# Patient Record
Sex: MALE | Race: WHITE | NOT HISPANIC OR LATINO | Employment: OTHER | URBAN - NONMETROPOLITAN AREA
[De-identification: names, ages, dates, MRNs, and addresses within clinical notes are randomized per-mention and may not be internally consistent; named-entity substitution may affect disease eponyms.]

---

## 2024-08-12 ENCOUNTER — OFFICE VISIT (OUTPATIENT)
Dept: CARDIOLOGY | Facility: CLINIC | Age: 74
End: 2024-08-12
Payer: MEDICARE

## 2024-08-12 VITALS
DIASTOLIC BLOOD PRESSURE: 66 MMHG | HEIGHT: 70 IN | HEART RATE: 72 BPM | WEIGHT: 257 LBS | SYSTOLIC BLOOD PRESSURE: 110 MMHG | OXYGEN SATURATION: 96 % | BODY MASS INDEX: 36.79 KG/M2

## 2024-08-12 DIAGNOSIS — G47.33 OBSTRUCTIVE SLEEP APNEA SYNDROME: Primary | ICD-10-CM

## 2024-08-12 PROCEDURE — 1159F MED LIST DOCD IN RCRD: CPT | Performed by: NURSE PRACTITIONER

## 2024-08-12 PROCEDURE — 99203 OFFICE O/P NEW LOW 30 MIN: CPT | Performed by: NURSE PRACTITIONER

## 2024-08-12 PROCEDURE — 1160F RVW MEDS BY RX/DR IN RCRD: CPT | Performed by: NURSE PRACTITIONER

## 2024-08-12 RX ORDER — MEMANTINE HYDROCHLORIDE 10 MG/1
TABLET ORAL
COMMUNITY

## 2024-08-12 RX ORDER — PANTOPRAZOLE SODIUM 40 MG/1
40 TABLET, DELAYED RELEASE ORAL DAILY
COMMUNITY

## 2024-08-12 RX ORDER — ATORVASTATIN CALCIUM 20 MG/1
20 TABLET, FILM COATED ORAL DAILY
COMMUNITY

## 2024-08-12 RX ORDER — LEVOTHYROXINE SODIUM 88 UG/1
TABLET ORAL
COMMUNITY

## 2024-08-12 RX ORDER — TAMSULOSIN HYDROCHLORIDE 0.4 MG/1
1 CAPSULE ORAL DAILY
COMMUNITY

## 2024-08-12 RX ORDER — DONEPEZIL HYDROCHLORIDE 10 MG/1
TABLET, FILM COATED ORAL
COMMUNITY

## 2024-08-12 RX ORDER — ASPIRIN 81 MG/1
81 TABLET ORAL DAILY
COMMUNITY

## 2024-08-12 RX ORDER — TRAZODONE HYDROCHLORIDE 50 MG/1
TABLET ORAL
COMMUNITY
End: 2024-08-12

## 2024-08-12 RX ORDER — ESCITALOPRAM OXALATE 10 MG/1
10 TABLET ORAL DAILY
COMMUNITY

## 2024-08-12 RX ORDER — BUSPIRONE HYDROCHLORIDE 30 MG/1
30 TABLET ORAL 2 TIMES DAILY
COMMUNITY

## 2024-08-12 NOTE — PROGRESS NOTES
Follow-Up Sleep Consult     Date:   2024  Name: Humble Gil  :   1950  PCP: Maria Del Carmen Reeder MD    Chief Complaint   Patient presents with   • Sleep Apnea     Wants to discuss inspire       Subjective     History of Present Illness  Humble Gil is a 74 y.o. male who presents today for follow-up on CARLENE.    Patient was last seen in our office 2022.  Patient states that he has come in today to discuss a referral for the Inspire device.  He states that he is tired of struggling with his CPAP machine and getting tangled up in the hose.  He states that he has not worn his CPAP in 3 to 4 months.  I have discussed with patient about the Inspire device.  Patient wants to stay here locally in [place].  I have placed referral to ENT Dr. Steff Paz for further evaluation.  Patient continues to have daytime sleepiness and fatigue.  He states that he is having these horrible dreams every night.  He is hoping that the inspire will help correct his sleep apnea and not have to wear the CPAP device.    CARLENE History:    19 Baseline AHI 13    Current settings: 8-18 cm    The patient's relevant past medical, surgical, family, and social history reviewed and updated in Epic as appropriate.    Past Medical History:   Diagnosis Date   • GERD (gastroesophageal reflux disease)    • Hyperlipidemia    • Hypothyroidism    • Kidney disease    • Sleep apnea     AHI 13     Past Surgical History:   Procedure Laterality Date   • CHOLECYSTECTOMY     • KIDNEY STONE SURGERY     • REPLACEMENT TOTAL KNEE         No Known Allergies  Prior to Admission medications    Medication Sig Start Date End Date Taking? Authorizing Provider   aspirin 81 MG EC tablet Take 1 tablet by mouth Daily.   Yes Provider, MD Georgi   atorvastatin (LIPITOR) 20 MG tablet Take 1 tablet by mouth Daily.   Yes Provider, MD Georgi   busPIRone (BUSPAR) 30 MG tablet Take 1 tablet by mouth 2 (Two) Times a Day.   Yes Provider, Georgi  "MD   donepezil (ARICEPT) 10 MG tablet one po BID   Yes Georgi Caballero MD   escitalopram (LEXAPRO) 10 MG tablet Take 1 tablet by mouth Daily.   Yes Georgi Caballero MD   levothyroxine (SYNTHROID, LEVOTHROID) 88 MCG tablet    Yes Georgi Caballero MD   memantine (NAMENDA) 10 MG tablet TAKE 1 TABLET TWICE DAILY AS DIRECTED   Yes Georgi Caballero MD   pantoprazole (PROTONIX) 40 MG EC tablet Take 1 tablet by mouth Daily.   Yes Georgi Caballero MD   tamsulosin (FLOMAX) 0.4 MG capsule 24 hr capsule Take 1 capsule by mouth Daily.   Yes Georgi Caballero MD   traZODone (DESYREL) 50 MG tablet TAKE 1/2 TABLET BY MOUTH AT BEDTIME AS NEEDED. INCREASE TO 1 TIF NEEDED  8/12/24  Georgi Caballero MD     Family History   Problem Relation Age of Onset   • Heart disease Father        Objective     Vital Signs:  /66   Pulse 72   Ht 177.8 cm (70\")   Wt 117 kg (257 lb)   SpO2 96%   BMI 36.88 kg/m²     Class 2 Severe Obesity (BMI >=35 and <=39.9). Obesity-related health conditions include the following: dyslipidemias. Obesity is unchanged. BMI is is above average; no BMI management plan is appropriate. We discussed portion control and increasing exercise.        Physical Exam  Constitutional:       Appearance: Normal appearance. He is obese.   Pulmonary:      Effort: Pulmonary effort is normal.   Skin:     General: Skin is warm and dry.   Neurological:      General: No focal deficit present.      Mental Status: He is alert and oriented to person, place, and time.   Psychiatric:         Mood and Affect: Mood normal.         Behavior: Behavior normal.         Thought Content: Thought content normal.         Judgment: Judgment normal.                      Assessment and Plan     Diagnoses and all orders for this visit:    1. Obstructive sleep apnea syndrome (Primary)  Assessment & Plan:  Patient has a baseline AHI of 13.  This is mild sleep apnea.  Patient reports he has not worn his PAP machine " in several months.  He states that he has struggled wearing his CPAP since he started.  Patient is requesting referral for further evaluation of the Inspire device.  He would like to stay in [place] if at all possible.  He continues to complain of daytime sleepiness and fatigue.  He has started having what he calls really bad dreams every night.  I have instructed patient that if he is not a candidate for the Inspire that we will continue to try to make him as comfortable as possible with his CPAP device.  I have sent referral to Dr. Steff Paz, ENT.    Follow-up in 6 months or sooner for any issues and concerns.    Orders:  -     Ambulatory Referral to ENT (Otolaryngology)        Report if any new/changing symptoms immediately, Sleep risks reviewed (driving, medical, sleep death, sedating agents), and Sleep hygiene discussed         Follow Up  Return in about 6 months (around 2/12/2025) for Next scheduled follow up.  Patient was given instructions and counseling regarding his condition or for health maintenance advice. Please see specific information pulled into the AVS if appropriate.

## 2024-08-12 NOTE — ASSESSMENT & PLAN NOTE
Patient has a baseline AHI of 13.  This is mild sleep apnea.  Patient reports he has not worn his PAP machine in several months.  He states that he has struggled wearing his CPAP since he started.  Patient is requesting referral for further evaluation of the Inspire device.  He would like to stay in [place] if at all possible.  He continues to complain of daytime sleepiness and fatigue.  He has started having what he calls really bad dreams every night.  I have instructed patient that if he is not a candidate for the Inspire that we will continue to try to make him as comfortable as possible with his CPAP device.  I have sent referral to Dr. Steff Paz, ENT.    Follow-up in 6 months or sooner for any issues and concerns.

## 2024-09-03 ENCOUNTER — TELEPHONE (OUTPATIENT)
Dept: CARDIOLOGY | Facility: CLINIC | Age: 74
End: 2024-09-03
Payer: MEDICARE

## 2024-09-03 NOTE — TELEPHONE ENCOUNTER
Received fax stating that there was no record found where patient has been seen by dr. Paz, [facility].